# Patient Record
Sex: MALE | Race: WHITE | NOT HISPANIC OR LATINO | Employment: FULL TIME | URBAN - METROPOLITAN AREA
[De-identification: names, ages, dates, MRNs, and addresses within clinical notes are randomized per-mention and may not be internally consistent; named-entity substitution may affect disease eponyms.]

---

## 2023-03-04 LAB — CALCIUM (MG/DL) IN SER/PLAS: 10 MG/DL (ref 8.6–10.3)

## 2023-07-14 LAB
ALANINE AMINOTRANSFERASE (SGPT) (U/L) IN SER/PLAS: 30 U/L (ref 10–52)
ALBUMIN (G/DL) IN SER/PLAS: 4.7 G/DL (ref 3.4–5)
ALKALINE PHOSPHATASE (U/L) IN SER/PLAS: 50 U/L (ref 33–136)
ANION GAP IN SER/PLAS: 12 MMOL/L (ref 10–20)
ASPARTATE AMINOTRANSFERASE (SGOT) (U/L) IN SER/PLAS: 25 U/L (ref 9–39)
BILIRUBIN TOTAL (MG/DL) IN SER/PLAS: 0.7 MG/DL (ref 0–1.2)
CALCIUM (MG/DL) IN SER/PLAS: 10.1 MG/DL (ref 8.6–10.3)
CARBON DIOXIDE, TOTAL (MMOL/L) IN SER/PLAS: 29 MMOL/L (ref 21–32)
CHLORIDE (MMOL/L) IN SER/PLAS: 100 MMOL/L (ref 98–107)
CHOLESTEROL (MG/DL) IN SER/PLAS: 121 MG/DL (ref 0–199)
CHOLESTEROL IN HDL (MG/DL) IN SER/PLAS: 41.3 MG/DL
CHOLESTEROL/HDL RATIO: 2.9
CREATININE (MG/DL) IN SER/PLAS: 1.06 MG/DL (ref 0.5–1.3)
ERYTHROCYTE DISTRIBUTION WIDTH (RATIO) BY AUTOMATED COUNT: 14.4 % (ref 11.5–14.5)
ERYTHROCYTE MEAN CORPUSCULAR HEMOGLOBIN CONCENTRATION (G/DL) BY AUTOMATED: 32.7 G/DL (ref 32–36)
ERYTHROCYTE MEAN CORPUSCULAR VOLUME (FL) BY AUTOMATED COUNT: 92 FL (ref 80–100)
ERYTHROCYTES (10*6/UL) IN BLOOD BY AUTOMATED COUNT: 4.66 X10E12/L (ref 4.5–5.9)
ESTIMATED AVERAGE GLUCOSE FOR HBA1C: 128 MG/DL
GFR MALE: 78 ML/MIN/1.73M2
GLUCOSE (MG/DL) IN SER/PLAS: 103 MG/DL (ref 74–99)
HEMATOCRIT (%) IN BLOOD BY AUTOMATED COUNT: 43.1 % (ref 41–52)
HEMOGLOBIN (G/DL) IN BLOOD: 14.1 G/DL (ref 13.5–17.5)
HEMOGLOBIN A1C/HEMOGLOBIN TOTAL IN BLOOD: 6.1 %
LDL: 50 MG/DL (ref 0–99)
LEUKOCYTES (10*3/UL) IN BLOOD BY AUTOMATED COUNT: 8.1 X10E9/L (ref 4.4–11.3)
PLATELETS (10*3/UL) IN BLOOD AUTOMATED COUNT: 235 X10E9/L (ref 150–450)
POTASSIUM (MMOL/L) IN SER/PLAS: 3.9 MMOL/L (ref 3.5–5.3)
PROTEIN TOTAL: 7.6 G/DL (ref 6.4–8.2)
SODIUM (MMOL/L) IN SER/PLAS: 137 MMOL/L (ref 136–145)
TRIGLYCERIDE (MG/DL) IN SER/PLAS: 150 MG/DL (ref 0–149)
UREA NITROGEN (MG/DL) IN SER/PLAS: 18 MG/DL (ref 6–23)
VLDL: 30 MG/DL (ref 0–40)

## 2024-01-09 ENCOUNTER — LAB (OUTPATIENT)
Dept: LAB | Facility: LAB | Age: 64
End: 2024-01-09
Payer: COMMERCIAL

## 2024-01-09 DIAGNOSIS — Z12.5 ENCOUNTER FOR SCREENING FOR MALIGNANT NEOPLASM OF PROSTATE: ICD-10-CM

## 2024-01-09 DIAGNOSIS — R73.01 IMPAIRED FASTING GLUCOSE: ICD-10-CM

## 2024-01-09 DIAGNOSIS — E78.5 HYPERLIPIDEMIA, UNSPECIFIED: ICD-10-CM

## 2024-01-09 DIAGNOSIS — E66.9 OBESITY, UNSPECIFIED: ICD-10-CM

## 2024-01-09 DIAGNOSIS — E66.9 OBESITY, UNSPECIFIED: Primary | ICD-10-CM

## 2024-01-09 LAB
ALBUMIN SERPL BCP-MCNC: 4.7 G/DL (ref 3.4–5)
ALP SERPL-CCNC: 64 U/L (ref 33–136)
ALT SERPL W P-5'-P-CCNC: 58 U/L (ref 10–52)
ANION GAP SERPL CALC-SCNC: 14 MMOL/L (ref 10–20)
AST SERPL W P-5'-P-CCNC: 31 U/L (ref 9–39)
BILIRUB SERPL-MCNC: 0.6 MG/DL (ref 0–1.2)
BUN SERPL-MCNC: 20 MG/DL (ref 6–23)
CALCIUM SERPL-MCNC: 10.2 MG/DL (ref 8.6–10.3)
CHLORIDE SERPL-SCNC: 100 MMOL/L (ref 98–107)
CHOLEST SERPL-MCNC: 120 MG/DL (ref 0–199)
CHOLESTEROL/HDL RATIO: 3.1
CO2 SERPL-SCNC: 30 MMOL/L (ref 21–32)
CREAT SERPL-MCNC: 1.04 MG/DL (ref 0.5–1.3)
EGFRCR SERPLBLD CKD-EPI 2021: 80 ML/MIN/1.73M*2
ERYTHROCYTE [DISTWIDTH] IN BLOOD BY AUTOMATED COUNT: 13.2 % (ref 11.5–14.5)
EST. AVERAGE GLUCOSE BLD GHB EST-MCNC: 140 MG/DL
GLUCOSE SERPL-MCNC: 130 MG/DL (ref 74–99)
HBA1C MFR BLD: 6.5 %
HCT VFR BLD AUTO: 45 % (ref 41–52)
HDLC SERPL-MCNC: 38.7 MG/DL
HGB BLD-MCNC: 14.5 G/DL (ref 13.5–17.5)
LDLC SERPL CALC-MCNC: 56 MG/DL
MCH RBC QN AUTO: 30.8 PG (ref 26–34)
MCHC RBC AUTO-ENTMCNC: 32.2 G/DL (ref 32–36)
MCV RBC AUTO: 96 FL (ref 80–100)
NON HDL CHOLESTEROL: 81 MG/DL (ref 0–149)
NRBC BLD-RTO: 0 /100 WBCS (ref 0–0)
PLATELET # BLD AUTO: 221 X10*3/UL (ref 150–450)
POTASSIUM SERPL-SCNC: 4.3 MMOL/L (ref 3.5–5.3)
PROT SERPL-MCNC: 7.3 G/DL (ref 6.4–8.2)
PSA SERPL-MCNC: 2.16 NG/ML
RBC # BLD AUTO: 4.71 X10*6/UL (ref 4.5–5.9)
SODIUM SERPL-SCNC: 140 MMOL/L (ref 136–145)
TRIGL SERPL-MCNC: 126 MG/DL (ref 0–149)
VLDL: 25 MG/DL (ref 0–40)
WBC # BLD AUTO: 10 X10*3/UL (ref 4.4–11.3)

## 2024-01-09 PROCEDURE — 36415 COLL VENOUS BLD VENIPUNCTURE: CPT

## 2024-01-09 PROCEDURE — 80061 LIPID PANEL: CPT

## 2024-01-09 PROCEDURE — 84153 ASSAY OF PSA TOTAL: CPT

## 2024-01-09 PROCEDURE — 83036 HEMOGLOBIN GLYCOSYLATED A1C: CPT

## 2024-01-09 PROCEDURE — 80053 COMPREHEN METABOLIC PANEL: CPT

## 2024-01-09 PROCEDURE — 85027 COMPLETE CBC AUTOMATED: CPT

## 2024-01-12 ENCOUNTER — TELEPHONE (OUTPATIENT)
Dept: GASTROENTEROLOGY | Facility: CLINIC | Age: 64
End: 2024-01-12
Payer: COMMERCIAL

## 2024-01-12 DIAGNOSIS — Z12.11 COLON CANCER SCREENING: Primary | ICD-10-CM

## 2024-01-12 NOTE — TELEPHONE ENCOUNTER
Patient LM to schedule a repeat colonoscopy with Dr. Fallon. Can you please call and get him scheduled? Thanks.

## 2024-01-13 RX ORDER — SODIUM, POTASSIUM,MAG SULFATES 17.5-3.13G
SOLUTION, RECONSTITUTED, ORAL ORAL
Qty: 354 ML | Refills: 0 | Status: SHIPPED | OUTPATIENT
Start: 2024-01-13 | End: 2024-03-13 | Stop reason: ALTCHOICE

## 2024-01-22 ENCOUNTER — OFFICE VISIT (OUTPATIENT)
Dept: PRIMARY CARE | Facility: CLINIC | Age: 64
End: 2024-01-22
Payer: COMMERCIAL

## 2024-01-22 VITALS
WEIGHT: 234 LBS | HEART RATE: 75 BPM | TEMPERATURE: 97.2 F | BODY MASS INDEX: 31.69 KG/M2 | DIASTOLIC BLOOD PRESSURE: 71 MMHG | HEIGHT: 72 IN | SYSTOLIC BLOOD PRESSURE: 126 MMHG

## 2024-01-22 DIAGNOSIS — I10 ESSENTIAL HYPERTENSION: ICD-10-CM

## 2024-01-22 DIAGNOSIS — Z87.891 FORMER SMOKER: ICD-10-CM

## 2024-01-22 DIAGNOSIS — H91.90 HEARING LOSS, UNSPECIFIED HEARING LOSS TYPE, UNSPECIFIED LATERALITY: ICD-10-CM

## 2024-01-22 DIAGNOSIS — E78.5 HYPERLIPIDEMIA, UNSPECIFIED HYPERLIPIDEMIA TYPE: ICD-10-CM

## 2024-01-22 DIAGNOSIS — R73.01 IFG (IMPAIRED FASTING GLUCOSE): ICD-10-CM

## 2024-01-22 DIAGNOSIS — Z00.00 ANNUAL PHYSICAL EXAM: ICD-10-CM

## 2024-01-22 PROCEDURE — 1159F MED LIST DOCD IN RCRD: CPT | Performed by: FAMILY MEDICINE

## 2024-01-22 PROCEDURE — 3074F SYST BP LT 130 MM HG: CPT | Performed by: FAMILY MEDICINE

## 2024-01-22 PROCEDURE — 99397 PER PM REEVAL EST PAT 65+ YR: CPT | Performed by: FAMILY MEDICINE

## 2024-01-22 PROCEDURE — 3008F BODY MASS INDEX DOCD: CPT | Performed by: FAMILY MEDICINE

## 2024-01-22 PROCEDURE — 1170F FXNL STATUS ASSESSED: CPT | Performed by: FAMILY MEDICINE

## 2024-01-22 PROCEDURE — 1036F TOBACCO NON-USER: CPT | Performed by: FAMILY MEDICINE

## 2024-01-22 PROCEDURE — 3078F DIAST BP <80 MM HG: CPT | Performed by: FAMILY MEDICINE

## 2024-01-22 PROCEDURE — 1126F AMNT PAIN NOTED NONE PRSNT: CPT | Performed by: FAMILY MEDICINE

## 2024-01-22 RX ORDER — LISINOPRIL AND HYDROCHLOROTHIAZIDE 10; 12.5 MG/1; MG/1
1 TABLET ORAL DAILY
Qty: 90 TABLET | Refills: 1 | Status: SHIPPED | OUTPATIENT
Start: 2024-01-22

## 2024-01-22 RX ORDER — ZINC GLUCONATE 50 MG
1 TABLET ORAL DAILY
COMMUNITY

## 2024-01-22 RX ORDER — VARDENAFIL HYDROCHLORIDE 20 MG/1
20 TABLET ORAL DAILY PRN
COMMUNITY
Start: 2018-09-06

## 2024-01-22 RX ORDER — ASPIRIN 81 MG/1
81 TABLET ORAL
COMMUNITY

## 2024-01-22 RX ORDER — ACETAMINOPHEN 500 MG
2000 TABLET ORAL DAILY
COMMUNITY

## 2024-01-22 RX ORDER — EZETIMIBE 10 MG/1
10 TABLET ORAL DAILY
Qty: 90 TABLET | Refills: 1 | Status: SHIPPED | OUTPATIENT
Start: 2024-01-22

## 2024-01-22 RX ORDER — EZETIMIBE 10 MG/1
1 TABLET ORAL DAILY
COMMUNITY
Start: 2021-12-30 | End: 2024-01-22 | Stop reason: SDUPTHER

## 2024-01-22 RX ORDER — ROSUVASTATIN CALCIUM 40 MG/1
40 TABLET, COATED ORAL NIGHTLY
Qty: 90 TABLET | Refills: 1 | Status: SHIPPED | OUTPATIENT
Start: 2024-01-22

## 2024-01-22 RX ORDER — LISINOPRIL AND HYDROCHLOROTHIAZIDE 10; 12.5 MG/1; MG/1
1 TABLET ORAL DAILY
COMMUNITY
End: 2024-01-22 | Stop reason: SDUPTHER

## 2024-01-22 RX ORDER — ROSUVASTATIN CALCIUM 40 MG/1
1 TABLET, COATED ORAL NIGHTLY
COMMUNITY
Start: 2021-12-10 | End: 2024-01-22 | Stop reason: SDUPTHER

## 2024-01-22 RX ORDER — ASCORBIC ACID 500 MG
1 TABLET ORAL DAILY
COMMUNITY

## 2024-01-22 ASSESSMENT — ACTIVITIES OF DAILY LIVING (ADL)
GROCERY_SHOPPING: INDEPENDENT
DOING_HOUSEWORK: INDEPENDENT
MANAGING_FINANCES: INDEPENDENT
TAKING_MEDICATION: INDEPENDENT
BATHING: INDEPENDENT
DRESSING: INDEPENDENT

## 2024-01-22 ASSESSMENT — PATIENT HEALTH QUESTIONNAIRE - PHQ9
1. LITTLE INTEREST OR PLEASURE IN DOING THINGS: NOT AT ALL
2. FEELING DOWN, DEPRESSED OR HOPELESS: NOT AT ALL
SUM OF ALL RESPONSES TO PHQ9 QUESTIONS 1 AND 2: 0

## 2024-01-22 NOTE — PROGRESS NOTES
Subjective     Patient ID: Juanito Smith is a 63 y.o. male who presents for Medicare Annual Wellness Visit Subsequent:    Problem List Items Addressed This Visit    None     Past Medical History:   Diagnosis Date    Acquired absence of other specified parts of digestive tract     Status post appendectomy    Encounter for other preprocedural examination     Preop testing    Obesity, unspecified 02/14/2022    Class 2 obesity with body mass index (BMI) of 35.0 to 35.9 in adult    Personal history of other diseases of the digestive system 08/19/2021    History of appendicitis    Personal history of other endocrine, nutritional and metabolic disease     History of hypercalcemia      Past Surgical History:   Procedure Laterality Date    OTHER SURGICAL HISTORY  11/15/2017    Excision Of Lesion Lower Extremity Left    OTHER SURGICAL HISTORY  01/26/2022    Neck surgery    OTHER SURGICAL HISTORY  01/26/2022    Knee surgery    OTHER SURGICAL HISTORY  01/26/2022    Appendectomy    OTHER SURGICAL HISTORY  01/26/2022    Laparoscopy    OTHER SURGICAL HISTORY  06/14/2022    Complete colonoscopy    SENTINEL LYMPH NODE BIOPSY  11/15/2017    [place] Lymph Node Biopsy      Family History   Problem Relation Name Age of Onset    Stroke Mother      Heart disease Father      Heart disease Brother      Hyperlipidemia Brother        Social History     Socioeconomic History    Marital status:      Spouse name: Not on file    Number of children: Not on file    Years of education: Not on file    Highest education level: Not on file   Occupational History    Not on file   Tobacco Use    Smoking status: Former     Types: Cigarettes    Smokeless tobacco: Never   Substance and Sexual Activity    Alcohol use: Yes     Alcohol/week: 14.0 standard drinks of alcohol     Types: 14 Standard drinks or equivalent per week    Drug use: Not Currently    Sexual activity: Not on file   Other Topics Concern    Not on file   Social History Narrative    Not  on file     Social Determinants of Health     Financial Resource Strain: Not on file   Food Insecurity: Not on file   Transportation Needs: Not on file   Physical Activity: Not on file   Stress: Not on file   Social Connections: Not on file   Intimate Partner Violence: Not on file   Housing Stability: Not on file      Patient has no known allergies.   Current Outpatient Medications   Medication Sig Dispense Refill    ascorbic acid (Vitamin C) 500 mg tablet Take 1 tablet (500 mg) by mouth once daily.      aspirin 81 mg EC tablet Take 1 tablet (81 mg) by mouth once daily.      cholecalciferol (Vitamin D-3) 50 mcg (2,000 unit) capsule Take 1 capsule (50 mcg) by mouth early in the morning..      ezetimibe (Zetia) 10 mg tablet Take 1 tablet (10 mg) by mouth once daily.      lisinopriL-hydrochlorothiazide 10-12.5 mg tablet Take 1 tablet by mouth once daily.      rosuvastatin (Crestor) 40 mg tablet Take 1 tablet (40 mg) by mouth once daily at bedtime.      sodium,potassium,mag sulfates (Suprep) 17.5-3.13-1.6 gram recon soln solution Take one bottle twice as directed by the prep instructions 354 mL 0    vardenafil (Levitra) 20 mg tablet Take 1 tablet (20 mg) by mouth once daily as needed.      zinc gluconate 50 mg tablet Take 1 tablet (50 mg) by mouth once daily.       No current facility-administered medications for this visit.       Immunization History   Administered Date(s) Administered    Pfizer Purple Cap SARS-CoV-2 03/05/2021, 03/24/2021, 11/15/2021    Tdap vaccine, age 7 year and older (BOOSTRIX) 09/06/2017        Review of Systems     Vitals:    01/22/24 1448   BP: 126/71   Pulse: 75   Temp: 36.2 °C (97.2 °F)     Vitals:    01/22/24 1448   Weight: 106 kg (234 lb)      Physical Exam     ASSESSMENT/PLAN:         Scribe Attestation  By signing my name below, I, Melly Martinez LPN, Scribe   attest that this documentation has been prepared under the direction and in the presence of Martir Wiggins MD.

## 2024-01-24 ASSESSMENT — ENCOUNTER SYMPTOMS
HEMATOLOGIC/LYMPHATIC NEGATIVE: 1
CONSTITUTIONAL NEGATIVE: 1
MUSCULOSKELETAL NEGATIVE: 1
EYES NEGATIVE: 1
ENDOCRINE NEGATIVE: 1
PSYCHIATRIC NEGATIVE: 1
ALLERGIC/IMMUNOLOGIC NEGATIVE: 1
GASTROINTESTINAL NEGATIVE: 1
RESPIRATORY NEGATIVE: 1
CARDIOVASCULAR NEGATIVE: 1

## 2024-01-24 NOTE — PROGRESS NOTES
Emiliano Man is here for a annual wellness visit.  He states that he has overall been feeling well.  He continues on his meds noted.  He has no new complaints.  The patient is aware of his need for a repeat colonoscopy earlier this year.  He is also due for a complete eye examination.    Patient ID: Juanito Smith is a 63 y.o. male who presents for No chief complaint on file.:    Problem List Items Addressed This Visit       Essential hypertension    Relevant Medications    ezetimibe (Zetia) 10 mg tablet    lisinopriL-hydrochlorothiazide 10-12.5 mg tablet    rosuvastatin (Crestor) 40 mg tablet    Other Relevant Orders    Comprehensive Metabolic Panel    Hemoglobin A1C    Hyperlipidemia    Relevant Medications    ezetimibe (Zetia) 10 mg tablet    lisinopriL-hydrochlorothiazide 10-12.5 mg tablet    rosuvastatin (Crestor) 40 mg tablet    Other Relevant Orders    Comprehensive Metabolic Panel    Hemoglobin A1C    IFG (impaired fasting glucose)    Relevant Medications    ezetimibe (Zetia) 10 mg tablet    lisinopriL-hydrochlorothiazide 10-12.5 mg tablet    rosuvastatin (Crestor) 40 mg tablet    Other Relevant Orders    Comprehensive Metabolic Panel    Hemoglobin A1C     Other Visit Diagnoses       BMI 31.0-31.9,adult        Annual physical exam        Former smoker        Hearing loss, unspecified hearing loss type, unspecified laterality        Relevant Orders    Referral to ENT           Past Medical History:   Diagnosis Date    Acquired absence of other specified parts of digestive tract     Status post appendectomy    Encounter for other preprocedural examination     Preop testing    Obesity, unspecified 02/14/2022    Class 2 obesity with body mass index (BMI) of 35.0 to 35.9 in adult    Personal history of other diseases of the digestive system 08/19/2021    History of appendicitis    Personal history of other endocrine, nutritional and metabolic disease     History of hypercalcemia      Past Surgical History:    Procedure Laterality Date    OTHER SURGICAL HISTORY  11/15/2017    Excision Of Lesion Lower Extremity Left    OTHER SURGICAL HISTORY  01/26/2022    Neck surgery    OTHER SURGICAL HISTORY  01/26/2022    Knee surgery    OTHER SURGICAL HISTORY  01/26/2022    Appendectomy    OTHER SURGICAL HISTORY  01/26/2022    Laparoscopy    OTHER SURGICAL HISTORY  06/14/2022    Complete colonoscopy    SENTINEL LYMPH NODE BIOPSY  11/15/2017    [place] Lymph Node Biopsy      Family History   Problem Relation Name Age of Onset    Stroke Mother      Heart disease Father      Heart disease Brother      Hyperlipidemia Brother        Social History     Socioeconomic History    Marital status:      Spouse name: Not on file    Number of children: Not on file    Years of education: Not on file    Highest education level: Not on file   Occupational History    Not on file   Tobacco Use    Smoking status: Former     Types: Cigarettes    Smokeless tobacco: Never   Substance and Sexual Activity    Alcohol use: Yes     Alcohol/week: 14.0 standard drinks of alcohol     Types: 14 Standard drinks or equivalent per week    Drug use: Not Currently    Sexual activity: Not on file   Other Topics Concern    Not on file   Social History Narrative    Not on file     Social Determinants of Health     Financial Resource Strain: Not on file   Food Insecurity: Not on file   Transportation Needs: Not on file   Physical Activity: Not on file   Stress: Not on file   Social Connections: Not on file   Intimate Partner Violence: Not on file   Housing Stability: Not on file      Patient has no known allergies.   Current Outpatient Medications   Medication Sig Dispense Refill    ascorbic acid (Vitamin C) 500 mg tablet Take 1 tablet (500 mg) by mouth once daily.      aspirin 81 mg EC tablet Take 1 tablet (81 mg) by mouth once daily.      cholecalciferol (Vitamin D-3) 50 mcg (2,000 unit) capsule Take 1 capsule (50 mcg) by mouth early in the morning..       sodium,potassium,mag sulfates (Suprep) 17.5-3.13-1.6 gram recon soln solution Take one bottle twice as directed by the prep instructions 354 mL 0    vardenafil (Levitra) 20 mg tablet Take 1 tablet (20 mg) by mouth once daily as needed.      zinc gluconate 50 mg tablet Take 1 tablet (50 mg) by mouth once daily.      ezetimibe (Zetia) 10 mg tablet Take 1 tablet (10 mg) by mouth once daily. 90 tablet 1    lisinopriL-hydrochlorothiazide 10-12.5 mg tablet Take 1 tablet by mouth once daily. 90 tablet 1    rosuvastatin (Crestor) 40 mg tablet Take 1 tablet (40 mg) by mouth once daily at bedtime. 90 tablet 1     No current facility-administered medications for this visit.       Immunization History   Administered Date(s) Administered    Pfizer Purple Cap SARS-CoV-2 03/05/2021, 03/24/2021, 11/15/2021    Tdap vaccine, age 7 year and older (BOOSTRIX) 09/06/2017        Review of Systems   Constitutional: Negative.    HENT:  Positive for hearing loss.    Eyes: Negative.    Respiratory: Negative.     Cardiovascular: Negative.    Gastrointestinal: Negative.    Endocrine: Negative.    Genitourinary: Negative.    Musculoskeletal: Negative.    Skin: Negative.    Allergic/Immunologic: Negative.    Hematological: Negative.    Psychiatric/Behavioral: Negative.     All other systems reviewed and are negative.       Vitals:    01/22/24 1448   BP: 126/71   Pulse: 75   Temp: 36.2 °C (97.2 °F)     Vitals:    01/22/24 1448   Weight: 106 kg (234 lb)      Physical Exam  Constitutional:       General: He is not in acute distress.     Appearance: Normal appearance.   HENT:      Right Ear: Tympanic membrane and ear canal normal.      Left Ear: Tympanic membrane and ear canal normal.   Neck:      Vascular: No carotid bruit.   Cardiovascular:      Rate and Rhythm: Normal rate and regular rhythm.      Pulses: Normal pulses.      Heart sounds: Normal heart sounds. No murmur heard.     No friction rub. No gallop.   Pulmonary:      Effort: Pulmonary  effort is normal.      Breath sounds: Normal breath sounds. No wheezing or rales.   Abdominal:      General: Abdomen is flat.      Palpations: Abdomen is soft. There is no mass.      Tenderness: There is no abdominal tenderness. There is no guarding.   Musculoskeletal:      Cervical back: Neck supple.   Neurological:      Mental Status: He is alert.   Psychiatric:         Mood and Affect: Mood normal.         Thought Content: Thought content normal.          ASSESSMENT/PLAN: Annual wellness visit  Hypertension stable  Diabetes mellitus type 2 with slight worsening A1c 6.5  Hyperlipidemia cholesterol 120 and LDL 56  Subjective bilateral hearing loss    Plan-continue current medications as noted.  We discussed immunizations including Prevnar 20 and shingles vaccination.  He will go to the pharmacy and check the cost of these immunizations since he is not yet on Medicare.  Patient defers flu vaccination  Proceed with colonoscopy  Recommended audiology evaluation and complete eye examination  Exercise regularly  Check CMP and A1c in 6 months  Follow-up 6 months and call as needed

## 2024-02-11 DIAGNOSIS — I10 ESSENTIAL HYPERTENSION: ICD-10-CM

## 2024-02-11 DIAGNOSIS — E78.5 HYPERLIPIDEMIA, UNSPECIFIED HYPERLIPIDEMIA TYPE: ICD-10-CM

## 2024-02-11 DIAGNOSIS — R73.01 IFG (IMPAIRED FASTING GLUCOSE): ICD-10-CM

## 2024-02-13 RX ORDER — ROSUVASTATIN CALCIUM 40 MG/1
40 TABLET, COATED ORAL NIGHTLY
Refills: 0 | OUTPATIENT
Start: 2024-02-13

## 2024-02-13 RX ORDER — LISINOPRIL AND HYDROCHLOROTHIAZIDE 10; 12.5 MG/1; MG/1
1 TABLET ORAL DAILY
Refills: 0 | OUTPATIENT
Start: 2024-02-13

## 2024-02-13 RX ORDER — EZETIMIBE 10 MG/1
10 TABLET ORAL DAILY
Refills: 0 | OUTPATIENT
Start: 2024-02-13

## 2024-02-15 ENCOUNTER — CLINICAL SUPPORT (OUTPATIENT)
Dept: AUDIOLOGY | Facility: CLINIC | Age: 64
End: 2024-02-15
Payer: COMMERCIAL

## 2024-02-15 ENCOUNTER — OFFICE VISIT (OUTPATIENT)
Dept: OTOLARYNGOLOGY | Facility: CLINIC | Age: 64
End: 2024-02-15
Payer: COMMERCIAL

## 2024-02-15 VITALS
HEIGHT: 71 IN | WEIGHT: 235 LBS | BODY MASS INDEX: 32.9 KG/M2 | TEMPERATURE: 98 F | DIASTOLIC BLOOD PRESSURE: 66 MMHG | SYSTOLIC BLOOD PRESSURE: 137 MMHG

## 2024-02-15 DIAGNOSIS — H93.8X2 PLUGGED FEELING IN EAR, LEFT: ICD-10-CM

## 2024-02-15 DIAGNOSIS — H69.93 ETD (EUSTACHIAN TUBE DYSFUNCTION), BILATERAL: ICD-10-CM

## 2024-02-15 DIAGNOSIS — H90.3 ASYMMETRICAL SENSORINEURAL HEARING LOSS: ICD-10-CM

## 2024-02-15 DIAGNOSIS — H93.8X2 CLOGGED EAR, LEFT: Primary | ICD-10-CM

## 2024-02-15 DIAGNOSIS — H90.3 ASYMMETRIC SNHL (SENSORINEURAL HEARING LOSS): Primary | ICD-10-CM

## 2024-02-15 PROCEDURE — 3078F DIAST BP <80 MM HG: CPT

## 2024-02-15 PROCEDURE — 1160F RVW MEDS BY RX/DR IN RCRD: CPT

## 2024-02-15 PROCEDURE — 3008F BODY MASS INDEX DOCD: CPT

## 2024-02-15 PROCEDURE — 3075F SYST BP GE 130 - 139MM HG: CPT

## 2024-02-15 PROCEDURE — 92557 COMPREHENSIVE HEARING TEST: CPT | Performed by: AUDIOLOGIST

## 2024-02-15 PROCEDURE — 1159F MED LIST DOCD IN RCRD: CPT

## 2024-02-15 PROCEDURE — 1126F AMNT PAIN NOTED NONE PRSNT: CPT

## 2024-02-15 PROCEDURE — 92550 TYMPANOMETRY & REFLEX THRESH: CPT | Performed by: AUDIOLOGIST

## 2024-02-15 PROCEDURE — 99203 OFFICE O/P NEW LOW 30 MIN: CPT

## 2024-02-15 PROCEDURE — 1036F TOBACCO NON-USER: CPT

## 2024-02-15 RX ORDER — FLUTICASONE PROPIONATE 50 MCG
2 SPRAY, SUSPENSION (ML) NASAL DAILY
Qty: 16 G | Refills: 11 | Status: SHIPPED | OUTPATIENT
Start: 2024-02-15 | End: 2025-02-14

## 2024-02-15 ASSESSMENT — PAIN - FUNCTIONAL ASSESSMENT: PAIN_FUNCTIONAL_ASSESSMENT: 0-10

## 2024-02-15 ASSESSMENT — ENCOUNTER SYMPTOMS: OCCASIONAL FEELINGS OF UNSTEADINESS: 0

## 2024-02-15 ASSESSMENT — PAIN SCALES - GENERAL: PAINLEVEL_OUTOF10: 0 - NO PAIN

## 2024-02-15 NOTE — PROGRESS NOTES
Patient ID: Juanito Smith is a 63 y.o. male who presents for the evaluation of bilateral hearing loss. They present as a referral from Dr. Martir Wiggins (PCP).    PROVIDER IMPRESSIONS:  DIAGNOSES/PROBLEMS:  -Asymmetric sensorineural hearing loss (ASNHL)  -Bilateral ETD  -Plugged feeling in ear, left    ASSESSMENT:   Juanito Smith is a pleasant 63 y.o. male who presents with symptoms of bilateral hearing loss and  plugged sensation in the left ear. Based on the clinical information provided, symptoms are likely consistent with recurrent episodes of bilateral ETD secondary to episodic nasal inflammation from URI or dry air in winter months. The patient was counseled on the possible etiologies of obstructive ETD. Typical symptoms were reviewed, including otalgia, aural fullness, muffled or decreased hearing, tinnitus, plugged ear sensation, and vertigo or disequilibrium in severe cases.  The patient was educated that jeison-challenge, such as air travel or land elevation, may provoke symptom presentation or exacerbation.   Audiogram reviewed in detail with the patient, which revealed type A tympanogram bilaterally and bilateral high-frequency sensorineural hearing loss with significant right nerve asymmetry. Patient reassured that otologic exam findings are normal and without signs of acute infection or inflammation. The patient was counseled on possible etiologies for asymmetry, including insult to auditory nerve from the following conditions: viral inflammation, vascular insufficiency, harmful noise exposure, and/or presence of neoplastic disease of the VII/VIII complex. Due to asymmetry in hearing (greater than 25 dB on audiogram), I explained to the patient that imaging is recommended in order to rule out an acoustic neuroma and/or other retrocochlear pathology. Reassurance provided to patient that otologic exam today revealed no evidence of acute infection/inflammation in the external auditory canal (EAC) bilaterally  and that tympanic membrane (TM) appears with no evidence of infection, effusion, retraction or perforation bilaterally.     PLAN:  I recommended MRI IAC w/wo contrast for ASNHL. Patient declined imaging at this time and opted for routine annual hearing surveillance instead, sooner for any new/worsening otologic symptoms.  I recommended starting nasal steroid spray  fluticasone (Flonase) for long term maintenance of ET orifice patency and prevention of recurrent ETD. I recommended either 2 puffs into each nostril daily, or 1 puff into each nostril twice daily for a consistent trial of at least 4-6 weeks. Patient counseled that this medication is a topical intranasal steroid nasal spray indicated to reduce nasal inflammation. Patient was educated on proper administration of nasal spray, by tilting their head down and pointing the applicator tip towards the lateral wall of the nose/corner of the eye on the same side. I advised patient to avoid pointing applicator toward the septum due to the risk of nasal bleeding.  Patient informed to discontinue the spray if they experience adverse side effects. A prescription was sent to the patient's pharmacy upon request.  Patient instructed to practice autoinsufflation (pinch your nose and pop your ears) several times per day to exercise the Eustachian Tube.   Follow-up: Patient may schedule for follow-up in 1 year with a repeat audiogram to evaluate treatment outcomes for ETD and monitor ASNHL. Patient is agreeable to plan. All questions answered to patient's satisfaction.    Subjective   HPI: Juanito Smith is a 63 y.o. male who presents for the evaluation of plugged sensation in the left ear. The patient states that symptom onset began approximately 1-2 years ago and will intermittently present at the onset of URI with rhinorrhea and in the winter months. Reports that hearing feels more muffled/clogged when his ear feels plugged. He reports that symptoms are alleviated when he  pops his ears. When asked about the presence of hearing loss, ear pain, ear fullness/pressure, tinnitus, ear itching, ear drainage, autophony, dizziness or vertigo, he admits to a history of gradual hearing loss in both ears for many years. Has difficulty hearing others in loud/noisy environments with high ceilings and struggles with discriminating speech clarity. He also reports that he has had episodes of dysequilibrium in the past with air travel and will last for days but has not experienced as of recently. Denies true vertigo episodes. When asked about pertinent otologic history, the patient denies a history of recurrent ear infections, denies history of ear surgery, denies history of PE tube insertion. 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  The patient does insert Q-tips in the ear canals 1-2 times a month, and  denies insertion of other foreign objects into the ear canals. The patient denies history of wearing hearing aid devices. The patient does not endorse a family history of hearing loss. Other past medical history includes HTN, HLD, DM2, and prior neck surgery with cervical spine fusion 15 years ago. Patient is a former smoker but quit 15 years ago (14 yrs x 1 PPD)    PATIENT HISTORY:  Past Medical History:   Diagnosis Date    Acquired absence of other specified parts of digestive tract     Status post appendectomy    Encounter for other preprocedural examination     Preop testing    Obesity, unspecified 02/14/2022    Class 2 obesity with body mass index (BMI) of 35.0 to 35.9 in adult    Personal history of other diseases of the digestive system 08/19/2021    History of appendicitis    Personal history of other endocrine, nutritional and metabolic disease     History of hypercalcemia      Past Surgical History:   Procedure  Laterality Date    OTHER SURGICAL HISTORY  11/15/2017    Excision Of Lesion Lower Extremity Left    OTHER SURGICAL HISTORY  01/26/2022    Neck surgery    OTHER SURGICAL HISTORY  01/26/2022    Knee surgery    OTHER SURGICAL HISTORY  01/26/2022    Appendectomy    OTHER SURGICAL HISTORY  01/26/2022    Laparoscopy    OTHER SURGICAL HISTORY  06/14/2022    Complete colonoscopy    SENTINEL LYMPH NODE BIOPSY  11/15/2017    [place] Lymph Node Biopsy      No Known Allergies     Current Outpatient Medications:     ascorbic acid (Vitamin C) 500 mg tablet, Take 1 tablet (500 mg) by mouth once daily., Disp: , Rfl:     aspirin 81 mg EC tablet, Take 1 tablet (81 mg) by mouth once daily., Disp: , Rfl:     cholecalciferol (Vitamin D-3) 50 mcg (2,000 unit) capsule, Take 1 capsule (50 mcg) by mouth early in the morning.., Disp: , Rfl:     ezetimibe (Zetia) 10 mg tablet, Take 1 tablet (10 mg) by mouth once daily., Disp: 90 tablet, Rfl: 1    lisinopriL-hydrochlorothiazide 10-12.5 mg tablet, Take 1 tablet by mouth once daily., Disp: 90 tablet, Rfl: 1    rosuvastatin (Crestor) 40 mg tablet, Take 1 tablet (40 mg) by mouth once daily at bedtime., Disp: 90 tablet, Rfl: 1    sodium,potassium,mag sulfates (Suprep) 17.5-3.13-1.6 gram recon soln solution, Take one bottle twice as directed by the prep instructions, Disp: 354 mL, Rfl: 0    vardenafil (Levitra) 20 mg tablet, Take 1 tablet (20 mg) by mouth once daily as needed., Disp: , Rfl:     zinc gluconate 50 mg tablet, Take 1 tablet (50 mg) by mouth once daily., Disp: , Rfl:    Tobacco Use: Medium Risk (1/24/2024)    Patient History     Smoking Tobacco Use: Former     Smokeless Tobacco Use: Never     Passive Exposure: Not on file      Alcohol Use: Not on file      Social History     Substance and Sexual Activity   Drug Use Not Currently        Review of Systems   All other systems negative.     Objective   Visit Vitals  Smoking Status Former        PHYSICAL EXAM:  General appearance: Appears  well, well-nourished, well groomed. No acute distress.   Constitutional: No fever, chills, weight loss or weight gain.  Communication: Normal communication  Psychiatric: Oriented to person, place and time. Normal mood and affect.  Neurologic: Cranial nerves II-XII grossly intact and symmetric bilaterally.  Cardiovascular: Examination of peripheral vascular system shows no clubbing or cyanosis.  Respiratory: No respiratory distress increased work of breathing. Inspection of the chest with symmetric chest expansion and normal respiratory effort.  Skin: No head and neck rashes.  Head: Normocephalic. Atraumatic with no masses, lesions or scarring.  Face: Normal symmetry. No scars or deformities.  Eyes: Conjunctiva not edematous or erythematous. PERRLA  Neck: Supple and symmetric, trachea midline. Lymph nodes with no adenopathy.  Head: Normocephalic. Atraumatic with no masses, lesions or scarring.  Eyes: PERRL, EOMI, Conjunctiva is clear. No nystagmus.  Nose: External inspection of nose: No nasal lesions, lacerations or scars. No tenderness on frontal or maxillary sinus palpation. Anterior rhinoscopy with limited visualization past the inferior turbinates: Septum is midline.  No septal perforation or lesions. No septal hematoma/ seroma.  No signs of bleeding.  No evidence of intranasal polyps.  Inferior turbinates are normal.    Throat:  Floor of mouth is clear, no masses.  Tongue appears normal, no lesions or masses. Gums, gingiva, buccal mucosa appear pink and moist, no lesions. Teeth are in intact.  No obvious dental infections.  Peritonsillar regions appear symmetric without swelling.  Hard and soft palate appear normal, no obvious cleft. Uvula is midline.  Left Tonsil -- 2+, no exudates.  Right Tonsil -- 2+, no exudates.  Oropharynx: No lesions. Retropharyngeal wall is flat.  []postnasal drip.  Salivary Glands: Symmetric bilaterally.  No palpable masses.  No evidence of acute infection or salivary stones.  TMJ:  Normal, no trismus.  Right Ear: External inspection of ear with no deformity, scars, or masses. Mastoid is nontender.   External auditory canal is clear. TM is intact with no sign of infection, effusion, or retraction.  No perforation seen.   Left Ear: External inspection of ear with no deformity, scars, or masses. Mastoid is nontender.   External auditory canal is clear. TM is intact with no sign of infection, effusion, or retraction.  No perforation seen.     RESULTS:  I personally reviewed the patient's audiogram today from 2/15/24, which showed: Right ear with normal hearing through 1000 Hz, sloping to mild to moderate sensorineural hearing loss through 4000 Hz, sloping to severe sensorineural hearing loss above. Left ear with normal hearing through 1000 Hz, sloping to mild sensorineural hearing loss through 3000 Hz, sloping to moderate sensorineural hearing loss above. Note there is significant right nerve asymmetry with greatest difference of 25 dB at 2000 Hz and 3000 Hz. Normal tympanogram bilaterally. Excellent word discrimination in the left ear and 72% word discrimination in the right ear at 75 dB. Acoustic reflexes present right ipsilateral, and left ipsilateral.    Esme Napier, APRN-CNP

## 2024-02-15 NOTE — PROGRESS NOTES
AUDIOLOGY ADULT AUDIOMETRIC EVALUATION      Name:  Juanito Smith  :  1960  Age:  63 y.o.  Date of Evaluation: 02/15/24    History:  Reason for visit:  Mr. Juanito Smith was seen today as part of the visit with SARAH Worthington for an evaluation of hearing.   Chief Complaint   Patient presents with    Ear Fullness     Clogged left ear      Stated he has recently noticed for the past year, his left ear will become clogged in the winter following colds and illnesses. Mentioned the sensation has been intermittent, however, currently feels the left ear is clogged.  History of known hearing loss due to loud noise exposure. Stated he has still be able to communicate and hear well at this time. 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   Mentioned some slight sensations of dizziness, but no significant vertigo or falls.   Denied any otalgia, tinnitus, ear surgery, recent ear/sinus infections, recent falls, sinus/throat concerns, ear drainage, or sudden hearing loss.    EVALUATION     See Audiogram    RESULTS:    Otoscopic Evaluation:   Right Ear: Otoscopy revealed a clear healthy canal and a healthy tympanic membrane was visualized.   Left Ear: Otoscopy revealed a clear healthy canal and a healthy tympanic membrane was visualized.     Immittance:  Immittance Measures: 226 Hz   Right Ear: Tympanometric testing revealed a normal type A tympanogram with normal middle ear pressure and normal static compliance.  Left Ear: Tympanometric testing revealed a normal type A tympanogram with normal middle ear pressure and normal static compliance.    Right Ear: Ipsilateral acoustic reflexes were present at, 500-2,000 Hz, at expected sensation levels and absent at 4,000 Hz.  Left Ear: Ipsilateral acoustic reflexes were present at, 500-4,000 Hz, at expected sensation levels.    Test technique:  Pure Tone Audiometry via insert earphones    Reliability:    excellent    Pure Tone Audiometry:    Right Ear: Audiometric testing indicated normal peripheral hearing sensitivity through 1,000 Hz, gradually sloping to a mild sensorineural hearing loss at 1,500 Hz, sloping to a moderate sensorineural hearing loss through 3,000 Hz, sloping to a severe sensorineural hearing loss above.  Left Ear:   Audiometric testing indicated normal peripheral hearing sensitivity through 2,000 Hz, sloping to a mild sensorineural hearing loss at 3,000 Hz, sloping to a severe sensorineural hearing loss above.  Note asymmetry in the right ear.      Speech Audiometry:   Right Ear:  Speech Reception Threshold (SRT) was obtained at 25 dBHL                  Word Recognition scores were fair (72%) in quiet when words were presented at 75 dBHL  Left Ear:  Speech Reception Threshold (SRT) was obtained at 25 dBHL                 Word Recognition scores were excellent (100%) in quiet when words were presented at 75 dBHL  Testing was performed with recorded NU-6 speech words in quiet. Speech thresholds were in good agreement with the pure tone averages in each ear.     IMPRESSIONS:  Today's test results are hearing loss requiring medical/otologic and audiologic follow-up.  The patient was counseled with regard to the findings.    Amplification needs:  Hearing aids were recommended due to the hearing loss.    RECOMMENDATIONS:  * Continue medical follow up with SARAH Worthington.  * Retest as medically indicated, or sooner if a change in hearing sensitivity is noticed.   * Wear hearing protection while in the presence of loud sounds.   * Pending medical management and patient desire, consider returning for a hearing aid evaluation to discuss amplification options and communication needs. The patient was instructed to call their insurance to check for any hearing aid benefits and to determine if [facility] was in network for hearing aids.   * Use effective communication strategies such  as asking the speaker to gain attention prior to speaking, speaking in the same room, repeating words that were heard, etc.    PATIENT EDUCATION:   Discussed results and recommendations with the patient and a copy of the hearing test was provided.  Questions were addressed and the patient was encouraged to contact our department should concerns arise.  The patient was seen from  10:50-11:10 am.

## 2024-02-18 NOTE — PATIENT INSTRUCTIONS
NASAL STEROID SPRAY INSTRUCTIONS: Please use the recommended topical nasal spray as directed. BE SURE TO POINT THE SPRAY TOWARDS THE CORNER OF THE EYE ON THE SAME SIDE NOSTRIL. This will ensure you are treating the appropriate parts of your nose that are swollen or inflamed. This medication will take upwards of one month before you notice full benefit. You MUST use it every day for it to be effective. This medication may be purchase over-the-counter or prescription may be submitted upon request.

## 2024-02-28 ENCOUNTER — ANESTHESIA EVENT (OUTPATIENT)
Dept: GASTROENTEROLOGY | Facility: EXTERNAL LOCATION | Age: 64
End: 2024-02-28

## 2024-03-13 ENCOUNTER — HOSPITAL ENCOUNTER (OUTPATIENT)
Dept: GASTROENTEROLOGY | Facility: EXTERNAL LOCATION | Age: 64
Discharge: HOME | End: 2024-03-13
Payer: COMMERCIAL

## 2024-03-13 ENCOUNTER — ANESTHESIA (OUTPATIENT)
Dept: GASTROENTEROLOGY | Facility: EXTERNAL LOCATION | Age: 64
End: 2024-03-13

## 2024-03-13 VITALS
SYSTOLIC BLOOD PRESSURE: 152 MMHG | HEIGHT: 72 IN | WEIGHT: 222 LBS | BODY MASS INDEX: 30.07 KG/M2 | HEART RATE: 74 BPM | DIASTOLIC BLOOD PRESSURE: 86 MMHG | RESPIRATION RATE: 16 BRPM | TEMPERATURE: 97.9 F | OXYGEN SATURATION: 100 %

## 2024-03-13 DIAGNOSIS — Z12.11 SPECIAL SCREENING FOR MALIGNANT NEOPLASMS, COLON: ICD-10-CM

## 2024-03-13 PROCEDURE — 88305 TISSUE EXAM BY PATHOLOGIST: CPT | Performed by: PATHOLOGY

## 2024-03-13 PROCEDURE — 45385 COLONOSCOPY W/LESION REMOVAL: CPT | Performed by: STUDENT IN AN ORGANIZED HEALTH CARE EDUCATION/TRAINING PROGRAM

## 2024-03-13 RX ORDER — SODIUM CHLORIDE 9 MG/ML
20 INJECTION, SOLUTION INTRAVENOUS CONTINUOUS
Status: DISCONTINUED | OUTPATIENT
Start: 2024-03-13 | End: 2024-03-14 | Stop reason: HOSPADM

## 2024-03-13 RX ORDER — ONDANSETRON HYDROCHLORIDE 2 MG/ML
4 INJECTION, SOLUTION INTRAVENOUS ONCE AS NEEDED
Status: DISCONTINUED | OUTPATIENT
Start: 2024-03-13 | End: 2024-03-14 | Stop reason: HOSPADM

## 2024-03-13 RX ORDER — LIDOCAINE HYDROCHLORIDE 20 MG/ML
INJECTION, SOLUTION INFILTRATION; PERINEURAL AS NEEDED
Status: DISCONTINUED | OUTPATIENT
Start: 2024-03-13 | End: 2024-03-13

## 2024-03-13 RX ORDER — PROPOFOL 10 MG/ML
INJECTION, EMULSION INTRAVENOUS AS NEEDED
Status: DISCONTINUED | OUTPATIENT
Start: 2024-03-13 | End: 2024-03-13

## 2024-03-13 RX ADMIN — PROPOFOL 30 MG: 10 INJECTION, EMULSION INTRAVENOUS at 10:15

## 2024-03-13 RX ADMIN — SODIUM CHLORIDE: 9 INJECTION, SOLUTION INTRAVENOUS at 09:50

## 2024-03-13 RX ADMIN — PROPOFOL 50 MG: 10 INJECTION, EMULSION INTRAVENOUS at 10:08

## 2024-03-13 RX ADMIN — PROPOFOL 50 MG: 10 INJECTION, EMULSION INTRAVENOUS at 10:02

## 2024-03-13 RX ADMIN — PROPOFOL 100 MG: 10 INJECTION, EMULSION INTRAVENOUS at 09:57

## 2024-03-13 RX ADMIN — PROPOFOL 20 MG: 10 INJECTION, EMULSION INTRAVENOUS at 10:11

## 2024-03-13 RX ADMIN — LIDOCAINE HYDROCHLORIDE 3 ML: 20 INJECTION, SOLUTION INFILTRATION; PERINEURAL at 09:57

## 2024-03-13 SDOH — HEALTH STABILITY: MENTAL HEALTH: CURRENT SMOKER: 0

## 2024-03-13 ASSESSMENT — PAIN - FUNCTIONAL ASSESSMENT
PAIN_FUNCTIONAL_ASSESSMENT: 0-10

## 2024-03-13 ASSESSMENT — COLUMBIA-SUICIDE SEVERITY RATING SCALE - C-SSRS
6. HAVE YOU EVER DONE ANYTHING, STARTED TO DO ANYTHING, OR PREPARED TO DO ANYTHING TO END YOUR LIFE?: NO
1. IN THE PAST MONTH, HAVE YOU WISHED YOU WERE DEAD OR WISHED YOU COULD GO TO SLEEP AND NOT WAKE UP?: NO
2. HAVE YOU ACTUALLY HAD ANY THOUGHTS OF KILLING YOURSELF?: NO
1. IN THE PAST MONTH, HAVE YOU WISHED YOU WERE DEAD OR WISHED YOU COULD GO TO SLEEP AND NOT WAKE UP?: NO
6. HAVE YOU EVER DONE ANYTHING, STARTED TO DO ANYTHING, OR PREPARED TO DO ANYTHING TO END YOUR LIFE?: NO
2. HAVE YOU ACTUALLY HAD ANY THOUGHTS OF KILLING YOURSELF?: NO

## 2024-03-13 ASSESSMENT — PAIN SCALES - GENERAL
PAIN_LEVEL: 0
PAINLEVEL_OUTOF10: 0 - NO PAIN

## 2024-03-13 NOTE — H&P
Procedure H&P    Patient Profile-Procedures  Name Juanito Smith  Date of Birth 1960  MRN [id number]  Address   [address] [place] [id number] [place] [address]    Primary Phone Number [phone]  Secondary Phone Number    Martir Jackson    Procedure(s):  Procedures: Colonoscopy  Primary contact name and number   Extended Emergency Contact Information  Primary Emergency Contact: Melissa Smith  Home Phone: [phone]  Relation: Spouse    General Health  Weight There were no vitals filed for this visit.  BMI There is no height or weight on file to calculate BMI.    Allergies  No Known Allergies    Past Medical History   Past Medical History:   Diagnosis Date    Acquired absence of other specified parts of digestive tract     Status post appendectomy    Encounter for other preprocedural examination     Preop testing    Obesity, unspecified 02/14/2022    Class 2 obesity with body mass index (BMI) of 35.0 to 35.9 in adult    Personal history of other diseases of the digestive system 08/19/2021    History of appendicitis    Personal history of other endocrine, nutritional and metabolic disease     History of hypercalcemia       Provider assessment  Diagnosis: Colon Cancer Screening/Surveillance   Medication Reviewed - yes  Prior to Admission medications    Medication Sig Start Date End Date Taking? Authorizing Provider   ascorbic acid (Vitamin C) 500 mg tablet Take 1 tablet (500 mg) by mouth once daily.    Historical Provider, MD   aspirin 81 mg EC tablet Take 1 tablet (81 mg) by mouth once daily.    Historical Provider, MD   cholecalciferol (Vitamin D-3) 50 mcg (2,000 unit) capsule Take 1 capsule (50 mcg) by mouth early in the morning..    Historical Provider, MD   ezetimibe (Zetia) 10 mg tablet Take 1 tablet (10 mg) by mouth once daily. 1/22/24   Martir Wiggins MD   fluticasone (Flonase) 50 mcg/actuation nasal spray Administer 2 sprays into each nostril once daily. Shake gently. Before first  use, prime pump. After use, clean tip and replace cap. 2/15/24 2/14/25  ADILSON Quispe-CNP   lisinopriL-hydrochlorothiazide 10-12.5 mg tablet Take 1 tablet by mouth once daily. 1/22/24   Martir Wiggins MD   rosuvastatin (Crestor) 40 mg tablet Take 1 tablet (40 mg) by mouth once daily at bedtime. 1/22/24   Martir Wiggins MD   sodium,potassium,mag sulfates (Suprep) 17.5-3.13-1.6 gram recon soln solution Take one bottle twice as directed by the prep instructions 1/13/24   Rafa Fallon MD   vardenafil (Levitra) 20 mg tablet Take 1 tablet (20 mg) by mouth once daily as needed. 9/6/18   Historical Provider, MD   zinc gluconate 50 mg tablet Take 1 tablet (50 mg) by mouth once daily.    Historical Provider, MD       Physical Exam  There were no vitals filed for this visit.     General: A&Ox3, NAD.  HEENT: AT/NC.   CV: RRR. No murmur.  Resp: CTA bilaterally. No wheezing, rhonchi or rales.   GI: Soft, NT/ND. BSx4.  Extrem: No edema. Pulses intact.  Skin: No Jaundice.   Neuro: No focal deficits.   Psych: Normal mood and affect.      Procedure Plan - pre-procedural (re)assesment completed by physician:  discharge/transfer patient when discharge criteria met    Rafa Fallon MD  3/13/2024 9:25 AM

## 2024-03-13 NOTE — LETTER
"  Dear  Mr Smith,    It was a pleasure to see you at [facility] for your colonoscopy. During the procedure, polyp(s) were detected.  The biopsy from the polyp(s) showed that the removed polyp(s) was \"Adenomatous\". Adenomatous polyps may progress into Colon cancer over time if they are not removed. The polyps seen during your procedure was (were) removed completely.    Biopsy of the polyp(s) did not reveal cancer.    Because we have concerns that you may develop adenomatous polyps in the future we would like to repeat the colonoscopy in 7 years.    Please keep this letter for your records and talk to your PCP regarding a referral for a repeat colonoscopy when it is due.    Thank you for the opportunity to participate in your care.     If you have any questions or concerns about the findings or my recommendations please do not hesitate to contact our office at [phone].    Best Regards,    Rafa Fallon MD  "

## 2024-03-13 NOTE — ANESTHESIA POSTPROCEDURE EVALUATION
Patient: Juanito Smith    Procedure Summary       Date: 03/13/24 Room / Location: [facility]    Anesthesia Start: 0954 Anesthesia Stop: 1024    Procedure: COLONOSCOPY Diagnosis: Special screening for malignant neoplasms, colon    Scheduled Providers: Rafa Fallon MD; Gema Gutierrez RN; Angella Logan MA; DAVIE Rivera Responsible Provider: DAVIE Rivera    Anesthesia Type: MAC ASA Status: 2            Anesthesia Type: MAC    Vitals Value Taken Time   /80 03/13/24 1024   Temp `36.6 03/13/24 1024   Pulse 78 03/13/24 1024   Resp 16 03/13/24 1024   SpO2 97 03/13/24 1024       Anesthesia Post Evaluation    Patient location during evaluation: bedside  Patient participation: complete - patient participated  Level of consciousness: awake  Pain score: 0  Pain management: adequate  Airway patency: patent  Cardiovascular status: acceptable  Respiratory status: acceptable  Hydration status: acceptable  Postoperative Nausea and Vomiting: none      There were no known notable events for this encounter.

## 2024-03-13 NOTE — ANESTHESIA PREPROCEDURE EVALUATION
Patient: Juanito Smith    Procedure Information       Date/Time: 03/13/24 1010    Scheduled providers: Rafa Flalon MD; Gema Gutierrez RN; Angella Logan MA; ADILSON Rivera-CRNA    Procedure: COLONOSCOPY    Location: [facility]            Relevant Problems   Cardiovascular   (+) Essential hypertension   (+) Hyperlipidemia       Clinical information reviewed:   Tobacco  Allergies  Meds   Med Hx  Surg Hx   Fam Hx  Soc Hx        NPO Detail:  NPO/Void Status  Date of Last Liquid: 03/11/24  Time of Last Liquid: 1800  Date of Last Solid: 03/13/24  Time of Last Solid: 0530         Physical Exam    Airway  Mallampati: II  TM distance: >3 FB  Neck ROM: full     Cardiovascular - normal exam  Rhythm: regular  Rate: normal     Dental - normal exam     Pulmonary - normal exam  Breath sounds clear to auscultation     Abdominal - normal exam  (+) obese  Abdomen: soft         Anesthesia Plan    History of general anesthesia?: yes  History of complications of general anesthesia?: no    ASA 2     MAC     The patient is not a current smoker.    intravenous induction   Anesthetic plan and risks discussed with patient.    Plan discussed with CRNA.

## 2024-03-13 NOTE — DISCHARGE INSTRUCTIONS
Patient Instructions after an Endoscopy      Post-procedure recommendations:  - The patient will be observed post-procedure, until all discharge criteria are met.   - Discharge the patient to home with family member/escort.  - Resume previous diet.  - Continue present medications.  - Observe patient's clinical course following today's procedure with therapeutic intervention.   - Watch for bleeding, perforation, and infection.   - Follow-up with referring physician.   - Return to primary care physician.  - The patient has a contact number available for  emergencies.  The signs and symptoms of potential delayed complications were discussed with the patient.  Return to normal activities tomorrow.  Written discharge instructions were provided to the patient.    The anesthetics, sedatives or narcotics which were given to you today will be acting in your body for the next 24 hours, so you might feel a little sleepy or groggy.  This feeling should slowly wear off. Carefully read and follow the instructions.     You received sedation today:  - Do not drive or operate any machinery or power tools of any kind.   - No alcoholic beverages today, not even beer or wine.  - Do not make any important decisions or sign any legal documents.  - No over the counter medications that contain alcohol or that may cause drowsiness.  - Do not make any important decisions or sign any legal documents.    While it is common to experience mild to moderate abdominal distention, gas, or belching after your procedure, if any of these symptoms occur following discharge from the GI Lab or within one week of having your procedure, call the [facility] to be advised whether a visit to your nearest Urgent Care or Emergency Department is indicated.  Take this paper with you if you go:  - If you develop an allergic reaction to the medications that were given during your procedure such as difficulty breathing, rash, hives, severe nausea,  vomiting or lightheadedness.  - If you experience chest pain, shortness of breath, severe abdominal pain, fevers and chills.  - If you develop signs and symptoms of bleeding such as blood in your spit, if your stools turn black, tarry, or bloody.  - If you have not urinated within 8 hours following your procedure.  - If your IV site becomes painful, red, inflamed, or looks infected.       If you experience any problems or have any questions following discharge from the GI Lab, please call: [phone]

## 2024-03-14 NOTE — ADDENDUM NOTE
Encounter addended by: Gema Gutierrez RN on: 3/14/2024 9:44 AM   Actions taken: Contacts section saved, Flowsheet accepted

## 2024-03-18 LAB
LABORATORY COMMENT REPORT: NORMAL
PATH REPORT.FINAL DX SPEC: NORMAL
PATH REPORT.GROSS SPEC: NORMAL
PATH REPORT.TOTAL CANCER: NORMAL

## 2024-07-08 ENCOUNTER — LAB (OUTPATIENT)
Dept: LAB | Facility: LAB | Age: 64
End: 2024-07-08
Payer: COMMERCIAL

## 2024-07-08 DIAGNOSIS — I10 ESSENTIAL HYPERTENSION: ICD-10-CM

## 2024-07-08 DIAGNOSIS — R73.01 IFG (IMPAIRED FASTING GLUCOSE): ICD-10-CM

## 2024-07-08 DIAGNOSIS — E78.5 HYPERLIPIDEMIA, UNSPECIFIED HYPERLIPIDEMIA TYPE: ICD-10-CM

## 2024-07-08 LAB
ALBUMIN SERPL BCP-MCNC: 4.6 G/DL (ref 3.4–5)
ALP SERPL-CCNC: 53 U/L (ref 33–136)
ALT SERPL W P-5'-P-CCNC: 44 U/L (ref 10–52)
ANION GAP SERPL CALC-SCNC: 15 MMOL/L (ref 10–20)
AST SERPL W P-5'-P-CCNC: 28 U/L (ref 9–39)
BILIRUB SERPL-MCNC: 0.6 MG/DL (ref 0–1.2)
BUN SERPL-MCNC: 27 MG/DL (ref 6–23)
CALCIUM SERPL-MCNC: 10.2 MG/DL (ref 8.6–10.6)
CHLORIDE SERPL-SCNC: 99 MMOL/L (ref 98–107)
CO2 SERPL-SCNC: 30 MMOL/L (ref 21–32)
CREAT SERPL-MCNC: 1.17 MG/DL (ref 0.5–1.3)
EGFRCR SERPLBLD CKD-EPI 2021: 69 ML/MIN/1.73M*2
EST. AVERAGE GLUCOSE BLD GHB EST-MCNC: 140 MG/DL
GLUCOSE SERPL-MCNC: 122 MG/DL (ref 74–99)
HBA1C MFR BLD: 6.5 %
POTASSIUM SERPL-SCNC: 4.6 MMOL/L (ref 3.5–5.3)
PROT SERPL-MCNC: 7.1 G/DL (ref 6.4–8.2)
SODIUM SERPL-SCNC: 139 MMOL/L (ref 136–145)

## 2024-07-08 PROCEDURE — 36415 COLL VENOUS BLD VENIPUNCTURE: CPT

## 2024-07-08 PROCEDURE — 83036 HEMOGLOBIN GLYCOSYLATED A1C: CPT

## 2024-07-08 PROCEDURE — 80053 COMPREHEN METABOLIC PANEL: CPT

## 2024-07-22 ENCOUNTER — APPOINTMENT (OUTPATIENT)
Dept: PRIMARY CARE | Facility: CLINIC | Age: 64
End: 2024-07-22
Payer: COMMERCIAL

## 2024-07-22 VITALS
WEIGHT: 215 LBS | HEIGHT: 71 IN | DIASTOLIC BLOOD PRESSURE: 66 MMHG | HEART RATE: 84 BPM | BODY MASS INDEX: 30.1 KG/M2 | TEMPERATURE: 97.7 F | SYSTOLIC BLOOD PRESSURE: 101 MMHG

## 2024-07-22 DIAGNOSIS — E66.3 OVERWEIGHT WITH BODY MASS INDEX (BMI) OF 29 TO 29.9 IN ADULT: ICD-10-CM

## 2024-07-22 DIAGNOSIS — Z87.891 FORMER SMOKER: ICD-10-CM

## 2024-07-22 DIAGNOSIS — R73.01 IFG (IMPAIRED FASTING GLUCOSE): ICD-10-CM

## 2024-07-22 DIAGNOSIS — I10 ESSENTIAL HYPERTENSION: ICD-10-CM

## 2024-07-22 DIAGNOSIS — E78.5 HYPERLIPIDEMIA, UNSPECIFIED HYPERLIPIDEMIA TYPE: ICD-10-CM

## 2024-07-22 PROCEDURE — 1036F TOBACCO NON-USER: CPT | Performed by: FAMILY MEDICINE

## 2024-07-22 PROCEDURE — 1160F RVW MEDS BY RX/DR IN RCRD: CPT | Performed by: FAMILY MEDICINE

## 2024-07-22 PROCEDURE — 3074F SYST BP LT 130 MM HG: CPT | Performed by: FAMILY MEDICINE

## 2024-07-22 PROCEDURE — 3078F DIAST BP <80 MM HG: CPT | Performed by: FAMILY MEDICINE

## 2024-07-22 PROCEDURE — 99213 OFFICE O/P EST LOW 20 MIN: CPT | Performed by: FAMILY MEDICINE

## 2024-07-22 PROCEDURE — 3008F BODY MASS INDEX DOCD: CPT | Performed by: FAMILY MEDICINE

## 2024-07-22 PROCEDURE — 1126F AMNT PAIN NOTED NONE PRSNT: CPT | Performed by: FAMILY MEDICINE

## 2024-07-22 PROCEDURE — 1159F MED LIST DOCD IN RCRD: CPT | Performed by: FAMILY MEDICINE

## 2024-07-22 RX ORDER — EZETIMIBE 10 MG/1
10 TABLET ORAL DAILY
Qty: 90 TABLET | Refills: 1 | Status: SHIPPED | OUTPATIENT
Start: 2024-07-22

## 2024-07-22 RX ORDER — ROSUVASTATIN CALCIUM 40 MG/1
40 TABLET, COATED ORAL NIGHTLY
Qty: 90 TABLET | Refills: 1 | Status: SHIPPED | OUTPATIENT
Start: 2024-07-22

## 2024-07-22 RX ORDER — LISINOPRIL AND HYDROCHLOROTHIAZIDE 10; 12.5 MG/1; MG/1
1 TABLET ORAL DAILY
Qty: 90 TABLET | Refills: 1 | Status: SHIPPED | OUTPATIENT
Start: 2024-07-22

## 2024-07-22 ASSESSMENT — PAIN SCALES - GENERAL: PAINLEVEL: 0-NO PAIN

## 2024-07-22 NOTE — PROGRESS NOTES
Emiliano Man is here for a follow-up on his hypertension and diabetes.  He states that he has overall been feeling well.  He has no new complaints at the present time.  He continues on his meds noted.  He has been trying to follow a low sugar and low-carb diet.  He states that he gets plenty of exercise at work.  Eye examinations are up-to-date    Patient ID: Juanito Smith is a 64 y.o. male who presents for Patient here for4 6 month check up:    Problem List Items Addressed This Visit    None     Past Medical History:   Diagnosis Date    Acquired absence of other specified parts of digestive tract     Status post appendectomy    Encounter for other preprocedural examination     Preop testing    Hyperlipidemia     Hypertension     Obesity, unspecified 02/14/2022    Class 2 obesity with body mass index (BMI) of 35.0 to 35.9 in adult    Personal history of other diseases of the digestive system 08/19/2021    History of appendicitis    Personal history of other endocrine, nutritional and metabolic disease     History of hypercalcemia      Past Surgical History:   Procedure Laterality Date    OTHER SURGICAL HISTORY  11/15/2017    Excision Of Lesion Lower Extremity Left    OTHER SURGICAL HISTORY  01/26/2022    Neck surgery    OTHER SURGICAL HISTORY  01/26/2022    Knee surgery    OTHER SURGICAL HISTORY  01/26/2022    Appendectomy    OTHER SURGICAL HISTORY  01/26/2022    Laparoscopy    OTHER SURGICAL HISTORY  06/14/2022    Complete colonoscopy    SENTINEL LYMPH NODE BIOPSY  11/15/2017    [place] Lymph Node Biopsy      Family History   Problem Relation Name Age of Onset    Stroke Mother      Heart disease Father      Heart disease Brother      Hyperlipidemia Brother        Social History     Socioeconomic History    Marital status:      Spouse name: Not on file    Number of children: Not on file    Years of education: Not on file    Highest education level: Not on file   Occupational History    Not on file    Tobacco Use    Smoking status: Former     Types: Cigarettes     Passive exposure: Past    Smokeless tobacco: Never   Vaping Use    Vaping status: Never Used   Substance and Sexual Activity    Alcohol use: Yes     Alcohol/week: 14.0 standard drinks of alcohol     Types: 14 Standard drinks or equivalent per week     Comment: social    Drug use: Not Currently    Sexual activity: Not on file   Other Topics Concern    Not on file   Social History Narrative    Not on file     Social Determinants of Health     Financial Resource Strain: Not on file   Food Insecurity: Not on file   Transportation Needs: Not on file   Physical Activity: Not on file   Stress: Not on file   Social Connections: Not on file   Intimate Partner Violence: Not on file   Housing Stability: Not on file      Patient has no known allergies.   Current Outpatient Medications   Medication Sig Dispense Refill    ascorbic acid (Vitamin C) 500 mg tablet Take 1 tablet (500 mg) by mouth once daily.      aspirin 81 mg EC tablet Take 1 tablet (81 mg) by mouth once daily.      cholecalciferol (Vitamin D-3) 50 mcg (2,000 unit) capsule Take 1 capsule (50 mcg) by mouth early in the morning..      ezetimibe (Zetia) 10 mg tablet Take 1 tablet (10 mg) by mouth once daily. 90 tablet 1    fluticasone (Flonase) 50 mcg/actuation nasal spray Administer 2 sprays into each nostril once daily. Shake gently. Before first use, prime pump. After use, clean tip and replace cap. 16 g 11    lisinopriL-hydrochlorothiazide 10-12.5 mg tablet Take 1 tablet by mouth once daily. 90 tablet 1    rosuvastatin (Crestor) 40 mg tablet Take 1 tablet (40 mg) by mouth once daily at bedtime. 90 tablet 1    vardenafil (Levitra) 20 mg tablet Take 1 tablet (20 mg) by mouth once daily as needed.      zinc gluconate 50 mg tablet Take 1 tablet (50 mg) by mouth once daily.       No current facility-administered medications for this visit.       Immunization History   Administered Date(s) Administered     Pfizer Purple Cap SARS-CoV-2 03/05/2021, 03/24/2021, 11/15/2021    Pneumococcal conjugate vaccine, 20-valent (PREVNAR 20) 02/20/2024    Tdap vaccine, age 7 year and older (BOOSTRIX, ADACEL) 09/06/2017        Review of Systems   Constitutional: Negative.    HENT: Negative.     Eyes: Negative.    Respiratory: Negative.     Cardiovascular: Negative.    Gastrointestinal: Negative.    Endocrine: Negative.    Genitourinary: Negative.    Musculoskeletal: Negative.    Skin: Negative.    Allergic/Immunologic: Negative.    Hematological: Negative.    Psychiatric/Behavioral: Negative.     All other systems reviewed and are negative.       Vitals:    07/22/24 1511   BP: 101/66   Pulse: 84   Temp: 36.5 °C (97.7 °F)     Vitals:    07/22/24 1511   Weight: 97.5 kg (215 lb)      Physical Exam  Constitutional:       General: He is not in acute distress.     Appearance: Normal appearance.   Cardiovascular:      Pulses: Normal pulses.      Heart sounds: Normal heart sounds. No murmur heard.     No friction rub.   Pulmonary:      Effort: Pulmonary effort is normal. No respiratory distress.      Breath sounds: Normal breath sounds. No wheezing or rales.   Musculoskeletal:      Cervical back: Neck supple.   Neurological:      Mental Status: He is alert.          ASSESSMENT/PLAN: Hypertension stable.  Continue current meds noted.    Diabetes mellitus type 2 stable with A1c 6.5.  Patient is encouraged to follow his diabetic diet.  Check CMP A1c lipid profile in 4 months.    Hyperlipidemia.  Continue rosuvastatin daily.  Check lipid profile as above    Colonoscopy completed  Recommended shingles vaccination  Follow-up in 4 months and call as needed     Scribe Attestation  By signing my name below, I, Melly Martinez LPN, Scribe   attest that this documentation has been prepared under the direction and in the presence of Martir Wiggins MD.

## 2024-07-23 ASSESSMENT — ENCOUNTER SYMPTOMS
ENDOCRINE NEGATIVE: 1
RESPIRATORY NEGATIVE: 1
PSYCHIATRIC NEGATIVE: 1
CARDIOVASCULAR NEGATIVE: 1
GASTROINTESTINAL NEGATIVE: 1
HEMATOLOGIC/LYMPHATIC NEGATIVE: 1
MUSCULOSKELETAL NEGATIVE: 1
EYES NEGATIVE: 1
ALLERGIC/IMMUNOLOGIC NEGATIVE: 1
CONSTITUTIONAL NEGATIVE: 1

## 2024-12-03 ENCOUNTER — LAB (OUTPATIENT)
Dept: LAB | Facility: LAB | Age: 64
End: 2024-12-03
Payer: COMMERCIAL

## 2024-12-03 DIAGNOSIS — I10 ESSENTIAL HYPERTENSION: ICD-10-CM

## 2024-12-03 DIAGNOSIS — R73.01 IFG (IMPAIRED FASTING GLUCOSE): ICD-10-CM

## 2024-12-03 DIAGNOSIS — E78.5 HYPERLIPIDEMIA, UNSPECIFIED HYPERLIPIDEMIA TYPE: ICD-10-CM

## 2024-12-03 LAB
ALBUMIN SERPL BCP-MCNC: 4.7 G/DL (ref 3.4–5)
ALP SERPL-CCNC: 54 U/L (ref 33–136)
ALT SERPL W P-5'-P-CCNC: 31 U/L (ref 10–52)
ANION GAP SERPL CALC-SCNC: 18 MMOL/L (ref 10–20)
AST SERPL W P-5'-P-CCNC: 23 U/L (ref 9–39)
BILIRUB SERPL-MCNC: 0.8 MG/DL (ref 0–1.2)
BUN SERPL-MCNC: 23 MG/DL (ref 6–23)
CALCIUM SERPL-MCNC: 10.6 MG/DL (ref 8.6–10.6)
CHLORIDE SERPL-SCNC: 99 MMOL/L (ref 98–107)
CHOLEST SERPL-MCNC: 117 MG/DL (ref 0–199)
CHOLESTEROL/HDL RATIO: 2.6
CO2 SERPL-SCNC: 27 MMOL/L (ref 21–32)
CREAT SERPL-MCNC: 1.3 MG/DL (ref 0.5–1.3)
EGFRCR SERPLBLD CKD-EPI 2021: 61 ML/MIN/1.73M*2
EST. AVERAGE GLUCOSE BLD GHB EST-MCNC: 128 MG/DL
GLUCOSE SERPL-MCNC: 117 MG/DL (ref 74–99)
HBA1C MFR BLD: 6.1 %
HDLC SERPL-MCNC: 45 MG/DL
LDLC SERPL CALC-MCNC: 45 MG/DL
NON HDL CHOLESTEROL: 72 MG/DL (ref 0–149)
POTASSIUM SERPL-SCNC: 3.6 MMOL/L (ref 3.5–5.3)
PROT SERPL-MCNC: 7.6 G/DL (ref 6.4–8.2)
SODIUM SERPL-SCNC: 140 MMOL/L (ref 136–145)
TRIGL SERPL-MCNC: 133 MG/DL (ref 0–149)
VLDL: 27 MG/DL (ref 0–40)

## 2024-12-03 PROCEDURE — 80061 LIPID PANEL: CPT

## 2024-12-03 PROCEDURE — 36415 COLL VENOUS BLD VENIPUNCTURE: CPT

## 2024-12-03 PROCEDURE — 80053 COMPREHEN METABOLIC PANEL: CPT

## 2024-12-03 PROCEDURE — 83036 HEMOGLOBIN GLYCOSYLATED A1C: CPT

## 2024-12-04 ENCOUNTER — TELEPHONE (OUTPATIENT)
Dept: PRIMARY CARE | Facility: CLINIC | Age: 64
End: 2024-12-04
Payer: COMMERCIAL

## 2024-12-04 NOTE — TELEPHONE ENCOUNTER
----- Message from Martir Wiggins sent at 12/3/2024  5:00 PM EST -----  Sugar and chol levels good.  Kidney function sl weaker.  Increase fluid intake and recheck labs  4 weeks

## 2025-01-23 ENCOUNTER — APPOINTMENT (OUTPATIENT)
Dept: PRIMARY CARE | Facility: CLINIC | Age: 65
End: 2025-01-23
Payer: COMMERCIAL

## 2025-01-23 VITALS
HEIGHT: 71 IN | TEMPERATURE: 98.4 F | HEART RATE: 73 BPM | DIASTOLIC BLOOD PRESSURE: 85 MMHG | WEIGHT: 235.2 LBS | SYSTOLIC BLOOD PRESSURE: 156 MMHG | BODY MASS INDEX: 32.93 KG/M2

## 2025-01-23 DIAGNOSIS — E78.5 HYPERLIPIDEMIA, UNSPECIFIED HYPERLIPIDEMIA TYPE: ICD-10-CM

## 2025-01-23 DIAGNOSIS — Z87.891 FORMER SMOKER: ICD-10-CM

## 2025-01-23 DIAGNOSIS — E66.811 CLASS 1 OBESITY WITH BODY MASS INDEX (BMI) OF 32.0 TO 32.9 IN ADULT, UNSPECIFIED OBESITY TYPE, UNSPECIFIED WHETHER SERIOUS COMORBIDITY PRESENT: ICD-10-CM

## 2025-01-23 DIAGNOSIS — Z12.5 PROSTATE CANCER SCREENING: ICD-10-CM

## 2025-01-23 DIAGNOSIS — H93.8X2 PLUGGED FEELING IN EAR, LEFT: ICD-10-CM

## 2025-01-23 DIAGNOSIS — H69.93 ETD (EUSTACHIAN TUBE DYSFUNCTION), BILATERAL: ICD-10-CM

## 2025-01-23 DIAGNOSIS — I10 ESSENTIAL HYPERTENSION: ICD-10-CM

## 2025-01-23 DIAGNOSIS — R73.01 IFG (IMPAIRED FASTING GLUCOSE): ICD-10-CM

## 2025-01-23 PROCEDURE — 3077F SYST BP >= 140 MM HG: CPT | Performed by: FAMILY MEDICINE

## 2025-01-23 PROCEDURE — 99213 OFFICE O/P EST LOW 20 MIN: CPT | Performed by: FAMILY MEDICINE

## 2025-01-23 PROCEDURE — 1036F TOBACCO NON-USER: CPT | Performed by: FAMILY MEDICINE

## 2025-01-23 PROCEDURE — 1159F MED LIST DOCD IN RCRD: CPT | Performed by: FAMILY MEDICINE

## 2025-01-23 PROCEDURE — 3008F BODY MASS INDEX DOCD: CPT | Performed by: FAMILY MEDICINE

## 2025-01-23 PROCEDURE — 1158F ADVNC CARE PLAN TLK DOCD: CPT | Performed by: FAMILY MEDICINE

## 2025-01-23 PROCEDURE — 3079F DIAST BP 80-89 MM HG: CPT | Performed by: FAMILY MEDICINE

## 2025-01-23 PROCEDURE — 1160F RVW MEDS BY RX/DR IN RCRD: CPT | Performed by: FAMILY MEDICINE

## 2025-01-23 PROCEDURE — 1123F ACP DISCUSS/DSCN MKR DOCD: CPT | Performed by: FAMILY MEDICINE

## 2025-01-23 ASSESSMENT — PATIENT HEALTH QUESTIONNAIRE - PHQ9
2. FEELING DOWN, DEPRESSED OR HOPELESS: NOT AT ALL
SUM OF ALL RESPONSES TO PHQ9 QUESTIONS 1 AND 2: 0
1. LITTLE INTEREST OR PLEASURE IN DOING THINGS: NOT AT ALL

## 2025-01-23 NOTE — PROGRESS NOTES
Emiliano Man is here for a follow-up on hypertension and diabetes.  He states that he has been overall feeling well.  He continues on his meds as noted without problems.  He has not been performing any home blood pressure monitoring.  He states that he does not drink a lot of fluids during the daytime.  He is under a lot of stress at work.    Patient ID: Juanito Smith is a 64 y.o. male who presents for Follow-up (6m follow up):    Problem List Items Addressed This Visit    None     Past Medical History:   Diagnosis Date    Acquired absence of other specified parts of digestive tract     Status post appendectomy    Encounter for other preprocedural examination     Preop testing    Hyperlipidemia     Hypertension     Obesity, unspecified 02/14/2022    Class 2 obesity with body mass index (BMI) of 35.0 to 35.9 in adult    Personal history of other diseases of the digestive system 08/19/2021    History of appendicitis    Personal history of other endocrine, nutritional and metabolic disease     History of hypercalcemia      Past Surgical History:   Procedure Laterality Date    OTHER SURGICAL HISTORY  11/15/2017    Excision Of Lesion Lower Extremity Left    OTHER SURGICAL HISTORY  01/26/2022    Neck surgery    OTHER SURGICAL HISTORY  01/26/2022    Knee surgery    OTHER SURGICAL HISTORY  01/26/2022    Appendectomy    OTHER SURGICAL HISTORY  01/26/2022    Laparoscopy    OTHER SURGICAL HISTORY  06/14/2022    Complete colonoscopy    SENTINEL LYMPH NODE BIOPSY  11/15/2017    [place] Lymph Node Biopsy      Family History   Problem Relation Name Age of Onset    Stroke Mother      Heart disease Father      Heart disease Brother      Hyperlipidemia Brother        Social History     Socioeconomic History    Marital status:      Spouse name: Not on file    Number of children: Not on file    Years of education: Not on file    Highest education level: Not on file   Occupational History    Not on file   Tobacco Use     Smoking status: Former     Types: Cigarettes     Passive exposure: Past    Smokeless tobacco: Never   Vaping Use    Vaping status: Never Used   Substance and Sexual Activity    Alcohol use: Yes     Alcohol/week: 14.0 standard drinks of alcohol     Types: 14 Standard drinks or equivalent per week     Comment: social    Drug use: Not Currently    Sexual activity: Not on file   Other Topics Concern    Not on file   Social History Narrative    Not on file     Social Drivers of Health     Financial Resource Strain: Not on file   Food Insecurity: Not on file   Transportation Needs: Not on file   Physical Activity: Not on file   Stress: Not on file   Social Connections: Not on file   Intimate Partner Violence: Not on file   Housing Stability: Not on file      Patient has no known allergies.   Current Outpatient Medications   Medication Sig Dispense Refill    ascorbic acid (Vitamin C) 500 mg tablet Take 1 tablet (500 mg) by mouth once daily.      aspirin 81 mg EC tablet Take 1 tablet (81 mg) by mouth once daily.      cholecalciferol (Vitamin D-3) 50 mcg (2,000 unit) capsule Take 1 capsule (50 mcg) by mouth early in the morning..      ezetimibe (Zetia) 10 mg tablet Take 1 tablet (10 mg) by mouth once daily. 90 tablet 1    fluticasone (Flonase) 50 mcg/actuation nasal spray Administer 2 sprays into each nostril once daily. Shake gently. Before first use, prime pump. After use, clean tip and replace cap. (Patient taking differently: Administer 2 sprays into each nostril if needed. Shake gently. Before first use, prime pump. After use, clean tip and replace cap.) 16 g 11    lisinopriL-hydrochlorothiazide 10-12.5 mg tablet Take 1 tablet by mouth once daily. 90 tablet 1    rosuvastatin (Crestor) 40 mg tablet Take 1 tablet (40 mg) by mouth once daily at bedtime. 90 tablet 1    vardenafil (Levitra) 20 mg tablet Take 1 tablet (20 mg) by mouth once daily as needed.      zinc gluconate 50 mg tablet Take 1 tablet (50 mg) by mouth once  daily.       No current facility-administered medications for this visit.       Immunization History   Administered Date(s) Administered    COVID-19, mRNA, LNP-S, PF, 30 mcg/0.3 mL dose 03/05/2021, 03/24/2021    Pfizer Purple Cap SARS-CoV-2 11/15/2021    Pneumococcal conjugate vaccine, 20-valent (PREVNAR 20) 02/20/2024    Tdap vaccine, age 7 year and older (BOOSTRIX, ADACEL) 09/06/2017    Zoster vaccine, recombinant, adult (SHINGRIX) 08/31/2024, 01/18/2025        Review of Systems   Constitutional: Negative.    HENT: Negative.     Eyes: Negative.    Respiratory: Negative.     Cardiovascular: Negative.    Gastrointestinal: Negative.    Endocrine: Negative.    Genitourinary: Negative.    Musculoskeletal: Negative.    Skin: Negative.    Allergic/Immunologic: Negative.    Hematological: Negative.    Psychiatric/Behavioral: Negative.     All other systems reviewed and are negative.       Vitals:    01/23/25 1547   BP: 156/85   Pulse: 73   Temp: 36.9 °C (98.4 °F)     Vitals:    01/23/25 1547   Weight: 107 kg (235 lb 3.2 oz)      Physical Exam  Constitutional:       General: He is not in acute distress.     Appearance: Normal appearance.   Cardiovascular:      Rate and Rhythm: Normal rate and regular rhythm.      Pulses: Normal pulses.      Heart sounds: Normal heart sounds. No murmur heard.     No friction rub. No gallop.   Pulmonary:      Effort: Pulmonary effort is normal. No respiratory distress.      Breath sounds: Normal breath sounds. No wheezing or rales.   Neurological:      General: No focal deficit present.      Mental Status: He is alert and oriented to person, place, and time. Mental status is at baseline.   Psychiatric:         Mood and Affect: Mood normal.         Thought Content: Thought content normal.         Judgment: Judgment normal.          ASSESSMENT/PLAN: Hypertension with slightly elevated reading.  Patient states that he had a very stressful day.  Recommended that he begin home blood pressure  monitoring and send a copy of these for review in 1 month.  Continue lisinopril HCT as noted.  Exercise regularly.    Diabetes mellitus type 2 improved with A1c 6.1.  Continue to follow diabetic diet.  Eye exam is up-to-date.  Check CMP and A1c in 4 months    Hyperlipidemia stable with cholesterol 117 and LDL 45.  Continue rosuvastatin daily.    Mild renal insufficiency with creatinine 1.3.  Recommended that he increase his fluid intake.  Recheck BMP in 3 months.    Colonoscopy up-to-date.  Immunizations are up-to-date  Follow-up in 4 months and call as needed       Scribe Attestation  By signing my name below, I, Melly Martinez LPN, Scribe   attest that this documentation has been prepared under the direction and in the presence of Martir Wiggins MD.

## 2025-01-24 ASSESSMENT — ENCOUNTER SYMPTOMS
EYES NEGATIVE: 1
HEMATOLOGIC/LYMPHATIC NEGATIVE: 1
CONSTITUTIONAL NEGATIVE: 1
ALLERGIC/IMMUNOLOGIC NEGATIVE: 1
GASTROINTESTINAL NEGATIVE: 1
PSYCHIATRIC NEGATIVE: 1
MUSCULOSKELETAL NEGATIVE: 1
ENDOCRINE NEGATIVE: 1
RESPIRATORY NEGATIVE: 1
CARDIOVASCULAR NEGATIVE: 1

## 2025-02-05 DIAGNOSIS — I10 ESSENTIAL HYPERTENSION: ICD-10-CM

## 2025-02-05 DIAGNOSIS — R73.01 IFG (IMPAIRED FASTING GLUCOSE): ICD-10-CM

## 2025-02-05 DIAGNOSIS — E78.5 HYPERLIPIDEMIA, UNSPECIFIED HYPERLIPIDEMIA TYPE: ICD-10-CM

## 2025-02-06 RX ORDER — ROSUVASTATIN CALCIUM 40 MG/1
40 TABLET, COATED ORAL NIGHTLY
Qty: 90 TABLET | Refills: 1 | Status: SHIPPED | OUTPATIENT
Start: 2025-02-06

## 2025-02-06 RX ORDER — EZETIMIBE 10 MG/1
10 TABLET ORAL DAILY
Qty: 90 TABLET | Refills: 1 | Status: SHIPPED | OUTPATIENT
Start: 2025-02-06

## 2025-02-06 RX ORDER — LISINOPRIL AND HYDROCHLOROTHIAZIDE 10; 12.5 MG/1; MG/1
1 TABLET ORAL DAILY
Qty: 90 TABLET | Refills: 1 | Status: SHIPPED | OUTPATIENT
Start: 2025-02-06

## 2025-02-09 LAB
ANION GAP SERPL CALCULATED.4IONS-SCNC: 9 MMOL/L (CALC) (ref 7–17)
BUN SERPL-MCNC: 24 MG/DL (ref 7–25)
BUN/CREAT SERPL: ABNORMAL (CALC) (ref 6–22)
CALCIUM SERPL-MCNC: 9.7 MG/DL (ref 8.6–10.3)
CHLORIDE SERPL-SCNC: 101 MMOL/L (ref 98–110)
CO2 SERPL-SCNC: 30 MMOL/L (ref 20–32)
CREAT SERPL-MCNC: 1.04 MG/DL (ref 0.7–1.35)
EGFRCR SERPLBLD CKD-EPI 2021: 79 ML/MIN/1.73M2
GLUCOSE SERPL-MCNC: 129 MG/DL (ref 65–99)
POTASSIUM SERPL-SCNC: 4.5 MMOL/L (ref 3.5–5.3)
SODIUM SERPL-SCNC: 140 MMOL/L (ref 135–146)

## 2025-02-10 ENCOUNTER — TELEPHONE (OUTPATIENT)
Dept: PRIMARY CARE | Facility: CLINIC | Age: 65
End: 2025-02-10
Payer: COMMERCIAL

## 2025-02-10 NOTE — TELEPHONE ENCOUNTER
----- Message from Martir Wiggins sent at 2/10/2025  7:08 AM EST -----  Tell pt kidney function improved

## 2025-02-18 ENCOUNTER — APPOINTMENT (OUTPATIENT)
Dept: AUDIOLOGY | Facility: CLINIC | Age: 65
End: 2025-02-18
Payer: COMMERCIAL

## 2025-02-18 ENCOUNTER — APPOINTMENT (OUTPATIENT)
Dept: OTOLARYNGOLOGY | Facility: CLINIC | Age: 65
End: 2025-02-18
Payer: COMMERCIAL

## 2025-07-13 LAB — PSA SERPL-MCNC: 2.58 NG/ML

## 2025-07-24 ENCOUNTER — APPOINTMENT (OUTPATIENT)
Dept: PRIMARY CARE | Facility: CLINIC | Age: 65
End: 2025-07-24
Payer: COMMERCIAL

## 2025-07-24 VITALS
HEIGHT: 73 IN | HEART RATE: 87 BPM | BODY MASS INDEX: 31.14 KG/M2 | DIASTOLIC BLOOD PRESSURE: 75 MMHG | TEMPERATURE: 97.9 F | SYSTOLIC BLOOD PRESSURE: 129 MMHG | WEIGHT: 235 LBS

## 2025-07-24 DIAGNOSIS — E78.5 HYPERLIPIDEMIA, UNSPECIFIED HYPERLIPIDEMIA TYPE: ICD-10-CM

## 2025-07-24 DIAGNOSIS — I10 ESSENTIAL HYPERTENSION: ICD-10-CM

## 2025-07-24 DIAGNOSIS — E66.811 CLASS 1 OBESITY WITH BODY MASS INDEX (BMI) OF 31.0 TO 31.9 IN ADULT, UNSPECIFIED OBESITY TYPE, UNSPECIFIED WHETHER SERIOUS COMORBIDITY PRESENT: ICD-10-CM

## 2025-07-24 DIAGNOSIS — Z00.00 ANNUAL PHYSICAL EXAM: ICD-10-CM

## 2025-07-24 DIAGNOSIS — Z87.891 FORMER SMOKER: ICD-10-CM

## 2025-07-24 DIAGNOSIS — R73.01 IFG (IMPAIRED FASTING GLUCOSE): ICD-10-CM

## 2025-07-24 PROCEDURE — 3078F DIAST BP <80 MM HG: CPT | Performed by: FAMILY MEDICINE

## 2025-07-24 PROCEDURE — 3008F BODY MASS INDEX DOCD: CPT | Performed by: FAMILY MEDICINE

## 2025-07-24 PROCEDURE — 1160F RVW MEDS BY RX/DR IN RCRD: CPT | Performed by: FAMILY MEDICINE

## 2025-07-24 PROCEDURE — 3074F SYST BP LT 130 MM HG: CPT | Performed by: FAMILY MEDICINE

## 2025-07-24 PROCEDURE — G0439 PPPS, SUBSEQ VISIT: HCPCS | Performed by: FAMILY MEDICINE

## 2025-07-24 PROCEDURE — 1159F MED LIST DOCD IN RCRD: CPT | Performed by: FAMILY MEDICINE

## 2025-07-24 RX ORDER — EZETIMIBE 10 MG/1
10 TABLET ORAL DAILY
Qty: 90 TABLET | Refills: 3 | Status: SHIPPED | OUTPATIENT
Start: 2025-07-24

## 2025-07-24 RX ORDER — LISINOPRIL AND HYDROCHLOROTHIAZIDE 10; 12.5 MG/1; MG/1
1 TABLET ORAL DAILY
Qty: 90 TABLET | Refills: 3 | Status: SHIPPED | OUTPATIENT
Start: 2025-07-24

## 2025-07-24 RX ORDER — ROSUVASTATIN CALCIUM 40 MG/1
40 TABLET, COATED ORAL NIGHTLY
Qty: 90 TABLET | Refills: 3 | Status: SHIPPED | OUTPATIENT
Start: 2025-07-24

## 2025-07-24 NOTE — PROGRESS NOTES
Emiliano Man is here for his annual wellness visit.  States that he has been overall feeling well and has no new complaints at the present time.  He continues on his meds noted without side effects.  Immunizations and colonoscopy are up-to-date.  He continues to to treat his diabetes with diet and exercise.  Eye examinations are up-to-date.    Patient ID: Juanito Smith is a 65 y.o. male who presents for Annual Exam (Physical):    Problem List Items Addressed This Visit    None     Medical History[1]   Surgical History[2]   Family History[3]   Social History     Socioeconomic History    Marital status:      Spouse name: Not on file    Number of children: Not on file    Years of education: Not on file    Highest education level: Not on file   Occupational History    Not on file   Tobacco Use    Smoking status: Former     Types: Cigarettes     Passive exposure: Past    Smokeless tobacco: Never   Vaping Use    Vaping status: Never Used   Substance and Sexual Activity    Alcohol use: Yes     Alcohol/week: 14.0 standard drinks of alcohol     Types: 14 Standard drinks or equivalent per week     Comment: social    Drug use: Not Currently    Sexual activity: Not on file   Other Topics Concern    Not on file   Social History Narrative    Not on file     Social Drivers of Health     Financial Resource Strain: Not on file   Food Insecurity: Not on file   Transportation Needs: Not on file   Physical Activity: Not on file   Stress: Not on file   Social Connections: Not on file   Intimate Partner Violence: Not on file   Housing Stability: Not on file      Patient has no known allergies.   Current Medications[4]    Immunization History   Administered Date(s) Administered    COVID-19, mRNA, LNP-S, PF, 30 mcg/0.3 mL dose 03/05/2021, 03/24/2021    Pfizer Purple Cap SARS-CoV-2 11/15/2021    Pneumococcal conjugate vaccine, 20-valent (PREVNAR 20) 02/20/2024    Tdap vaccine, age 7 year and older (BOOSTRIX, ADACEL) 09/06/2017     Zoster vaccine, recombinant, adult (SHINGRIX) 08/31/2024, 01/18/2025        Review of Systems   Constitutional: Negative.    HENT: Negative.     Eyes: Negative.    Respiratory: Negative.     Cardiovascular: Negative.    Gastrointestinal: Negative.    Endocrine: Negative.    Genitourinary: Negative.    Musculoskeletal: Negative.    Skin: Negative.    Allergic/Immunologic: Negative.    Hematological: Negative.    Psychiatric/Behavioral: Negative.     All other systems reviewed and are negative.       Vitals:    07/24/25 1531   BP: 129/75   Pulse: 87   Temp: 36.6 °C (97.9 °F)     Vitals:    07/24/25 1531   Weight: 107 kg (235 lb)      Physical Exam  Constitutional:       General: He is not in acute distress.     Appearance: Normal appearance.   Neck:      Vascular: No carotid bruit.     Cardiovascular:      Rate and Rhythm: Normal rate and regular rhythm.      Pulses: Normal pulses.      Heart sounds: Normal heart sounds. No murmur heard.     No friction rub. No gallop.   Pulmonary:      Effort: Pulmonary effort is normal. No respiratory distress.      Breath sounds: Normal breath sounds. No wheezing or rales.   Abdominal:      General: Abdomen is flat. There is no distension.      Palpations: Abdomen is soft. There is no mass.      Tenderness: There is no abdominal tenderness. There is no guarding.     Musculoskeletal:      Cervical back: Neck supple.   Lymphadenopathy:      Cervical: No cervical adenopathy.     Neurological:      General: No focal deficit present.      Mental Status: He is alert and oriented to person, place, and time. Mental status is at baseline.          ASSESSMENT/PLAN: Annual wellness visit.  Check CMP A1c lipid profile in November 2025.  PSA is up-to-date.  Colonoscopy up-to-date.  Immunizations up-to-date.  Recommended and ordered CT cardiac calcium score    Hypertension stable.  Continue lisinopril HCT daily    Hyperlipidemia.  Continue rosuvastatin and ezetimibe daily    Diabetes mellitus  type 2.  Continue to monitor diet.  Check A1c and November 2025.    History of mild renal insufficiency stable.  Continue to monitor.    Follow-up in 4 months and call as needed     Scribe Attestation  By signing my name below, I, Melly Martinez LPN, Scribe   attest that this documentation has been prepared under the direction and in the presence of Martir Wiggins MD.         [1]   Past Medical History:  Diagnosis Date    Acquired absence of other specified parts of digestive tract     Status post appendectomy    Encounter for other preprocedural examination     Preop testing    Hyperlipidemia     Hypertension     Obesity, unspecified 02/14/2022    Class 2 obesity with body mass index (BMI) of 35.0 to 35.9 in adult    Personal history of other diseases of the digestive system 08/19/2021    History of appendicitis    Personal history of other endocrine, nutritional and metabolic disease     History of hypercalcemia   [2]   Past Surgical History:  Procedure Laterality Date    OTHER SURGICAL HISTORY  11/15/2017    Excision Of Lesion Lower Extremity Left    OTHER SURGICAL HISTORY  01/26/2022    Neck surgery    OTHER SURGICAL HISTORY  01/26/2022    Knee surgery    OTHER SURGICAL HISTORY  01/26/2022    Appendectomy    OTHER SURGICAL HISTORY  01/26/2022    Laparoscopy    OTHER SURGICAL HISTORY  06/14/2022    Complete colonoscopy    SENTINEL LYMPH NODE BIOPSY  11/15/2017    [place] Lymph Node Biopsy   [3]   Family History  Problem Relation Name Age of Onset    Stroke Mother      Heart disease Father      Heart disease Brother      Hyperlipidemia Brother     [4]   Current Outpatient Medications   Medication Sig Dispense Refill    ascorbic acid (Vitamin C) 500 mg tablet Take 1 tablet (500 mg) by mouth once daily.      aspirin 81 mg EC tablet Take 1 tablet (81 mg) by mouth once daily.      cholecalciferol (Vitamin D-3) 50 mcg (2,000 unit) capsule Take 1 capsule (2,000 Units) by mouth early in the morning..       ezetimibe (Zetia) 10 mg tablet TAKE 1 TABLET ONCE DAILY 90 tablet 1    fluticasone (Flonase) 50 mcg/actuation nasal spray Administer 2 sprays into each nostril once daily. Shake gently. Before first use, prime pump. After use, clean tip and replace cap. 16 g 11    lisinopriL-hydrochlorothiazide 10-12.5 mg tablet TAKE 1 TABLET ONCE DAILY 90 tablet 1    rosuvastatin (Crestor) 40 mg tablet TAKE 1 TABLET ONCE DAILY ATBEDTIME 90 tablet 1    vardenafil (Levitra) 20 mg tablet Take 1 tablet (20 mg) by mouth once daily as needed.      zinc gluconate 50 mg tablet Take 1 tablet by mouth once daily.       No current facility-administered medications for this visit.

## 2025-07-26 ASSESSMENT — ENCOUNTER SYMPTOMS
GASTROINTESTINAL NEGATIVE: 1
MUSCULOSKELETAL NEGATIVE: 1
EYES NEGATIVE: 1
CARDIOVASCULAR NEGATIVE: 1
HEMATOLOGIC/LYMPHATIC NEGATIVE: 1
CONSTITUTIONAL NEGATIVE: 1
ENDOCRINE NEGATIVE: 1
PSYCHIATRIC NEGATIVE: 1
ALLERGIC/IMMUNOLOGIC NEGATIVE: 1
RESPIRATORY NEGATIVE: 1

## 2026-01-26 ENCOUNTER — APPOINTMENT (OUTPATIENT)
Dept: PRIMARY CARE | Facility: CLINIC | Age: 66
End: 2026-01-26
Payer: COMMERCIAL